# Patient Record
Sex: FEMALE | Race: WHITE | NOT HISPANIC OR LATINO | Employment: OTHER | URBAN - METROPOLITAN AREA
[De-identification: names, ages, dates, MRNs, and addresses within clinical notes are randomized per-mention and may not be internally consistent; named-entity substitution may affect disease eponyms.]

---

## 2024-05-21 ENCOUNTER — HOSPITAL ENCOUNTER (OUTPATIENT)
Dept: OTHER | Facility: HOSPITAL | Age: 74
Discharge: HOME | End: 2024-05-21

## 2024-06-19 ENCOUNTER — OFFICE VISIT (OUTPATIENT)
Dept: NEUROSURGERY | Facility: CLINIC | Age: 74
End: 2024-06-19
Payer: MEDICARE

## 2024-06-19 VITALS — SYSTOLIC BLOOD PRESSURE: 160 MMHG | HEART RATE: 66 BPM | DIASTOLIC BLOOD PRESSURE: 64 MMHG | WEIGHT: 113.1 LBS

## 2024-06-19 DIAGNOSIS — M25.512 PAIN OF LEFT SHOULDER WITH EXTERNAL ROTATION: Primary | ICD-10-CM

## 2024-06-19 DIAGNOSIS — M47.812 CERVICAL SPONDYLOSIS WITHOUT MYELOPATHY: ICD-10-CM

## 2024-06-19 PROCEDURE — 1159F MED LIST DOCD IN RCRD: CPT | Performed by: NURSE PRACTITIONER

## 2024-06-19 PROCEDURE — 99203 OFFICE O/P NEW LOW 30 MIN: CPT | Performed by: NURSE PRACTITIONER

## 2024-06-19 PROCEDURE — 1160F RVW MEDS BY RX/DR IN RCRD: CPT | Performed by: NURSE PRACTITIONER

## 2024-06-19 RX ORDER — SERTRALINE HYDROCHLORIDE 25 MG/1
1 TABLET, FILM COATED ORAL DAILY
COMMUNITY

## 2024-06-19 RX ORDER — MULTIPLE VITAMINS W/ MINERALS TAB 9MG-400MCG
1 TAB ORAL DAILY
COMMUNITY

## 2024-06-19 RX ORDER — SENNOSIDES 8.6 MG
650 CAPSULE ORAL EVERY 8 HOURS PRN
COMMUNITY

## 2024-06-19 RX ORDER — RIVAROXABAN 15 MG/1
1 TABLET, FILM COATED ORAL DAILY
COMMUNITY
Start: 2024-04-04

## 2024-06-19 NOTE — PROGRESS NOTES
Chief Complaint  Neck Pain (Neck pain that radiates into arms. Left worse.)    Subjective          Aleyda Randolph who is a 74 y.o. year old female who presents to [facility] for evaluation of cervical spine.     The patient complains of pain located in the cervical spine.  Patients states the pain has been present for several months.  The pain came on gradually. Pt suffered a fall in August of last year, landing on her left arm and fracturing her elbow. She underwent surgery and was in a brace for a duration of time. She began to developed pain in the left upper arm, which then progressed into the shoulder and neck. The pain scale level is 5.  She denies radicular pain. She has aching pain throughout the entirety of the left arm. The pain is constant and waxing/waning and described as aching.  The pain is worse at no particular time of day. Patient states lifting and head turning makes the pain worse.  Patient states rest makes the pain better.    Associated Symptoms Include: Denies numbness and tingling  Conservative Interventions Include:  Tylenol provides her modest benefit. She was referred to physical therapy, primarily for the left arm and shoulder, and some for the neck. She also received an injection in the left shoulder.     Was this the result of an injury or accident? : No    History of Previous Spinal Surgery?: No    Nicotine use: former smoker, quit 40-50 years ago    BMI: There is no height or weight on file to calculate BMI.      Review of Systems   Musculoskeletal:  Positive for myalgias, neck pain, neck stiffness and bursitis.   Neurological:  Positive for weakness and numbness.   All other systems reviewed and are negative.       Objective   Vital Signs:   /64 (BP Location: Right arm, Patient Position: Sitting, Cuff Size: Small Adult)   Pulse 66   Wt 51.3 kg (113 lb 1.6 oz)       Physical Exam  Vitals reviewed.   Constitutional:        Appearance: Normal appearance.   Musculoskeletal:      Right shoulder: No tenderness. Normal range of motion.      Left shoulder: Tenderness and crepitus present. Decreased range of motion.      Cervical back: Tenderness present. Pain with movement present. Decreased range of motion.   Neurological:      Mental Status: She is alert and oriented to person, place, and time.      Motor: Motor strength is normal.     Gait: Gait is intact.      Deep Tendon Reflexes:      Reflex Scores:       Tricep reflexes are 2+ on the right side and 2+ on the left side.       Bicep reflexes are 2+ on the right side and 2+ on the left side.       Brachioradialis reflexes are 2+ on the right side and 2+ on the left side.       Neurologic Exam     Mental Status   Oriented to person, place, and time.   Level of consciousness: alert    Motor Exam   Muscle bulk: normal  Overall muscle tone: normal    Strength   Strength 5/5 throughout.     Sensory Exam   Light touch normal.     Gait, Coordination, and Reflexes     Gait  Gait: normal    Reflexes   Right brachioradialis: 2+  Left brachioradialis: 2+  Right biceps: 2+  Left biceps: 2+  Right triceps: 2+  Left triceps: 2+  Right Sun: absent  Left Sun: absent       Result Review :       Data reviewed : Radiologic studies MRI Cervical Spine on 5/21/24 at [facility] personally reviewed and interpreted. Multilevel spondylosis, most significant at C6/7, where there are endplate changes with a disc osteophyte complex and severe facet hypertrophy resulting in moderate spinal canal and severe right, moderate left foraminal stenosis. This is the most notable finding.           Assessment and Plan    Diagnoses and all orders for this visit:    1. Pain of left shoulder with external rotation (Primary)  -     MRI Shoulder Left Without Contrast; Future    2. Cervical spondylosis without myelopathy    Pt presenting for evaluation of left arm pain, following a fall in August of last year. We  reviewed her MRI cervical Spine, demonstrating multilevel spondylosis most significant at C6/7.     Her exam demonstrates pain most significant in the left shoulder, with limited range of motion and pain in the shoulder with rotation. I am concerned that the symptoms are coming from the shoulder. Will proceed with MRI of the left shoulder to evaluate for rotator cuff tear.      Follow Up   Return if symptoms worsen or fail to improve.  Patient was given instructions and counseling regarding her condition or for health maintenance advice.

## 2024-06-20 ENCOUNTER — PATIENT ROUNDING (BHMG ONLY) (OUTPATIENT)
Dept: NEUROSURGERY | Facility: CLINIC | Age: 74
End: 2024-06-20
Payer: MEDICARE

## 2024-07-09 DIAGNOSIS — M25.512 PAIN OF LEFT SHOULDER WITH EXTERNAL ROTATION: ICD-10-CM

## 2024-07-09 NOTE — PROGRESS NOTES
Recommend follow up with Ortho regarding MRI findings of the shoulder. She can follow up with Dr. Miguel or we can refer to Ortho in [place] if she prefers.

## 2024-07-10 ENCOUNTER — TELEPHONE (OUTPATIENT)
Dept: NEUROSURGERY | Facility: CLINIC | Age: 74
End: 2024-07-10
Payer: MEDICARE

## 2024-07-10 NOTE — TELEPHONE ENCOUNTER
----- Message from Chelle RAMIREZ sent at 7/10/2024  1:40 PM EDT -----  Do you happen to have the fax number? He's in [place]. I was trying to fax MRI but I can't get anyone on the phone.  ----- Message -----  From: Crystal Mcbride APRN  Sent: 7/9/2024   3:11 PM EDT  To: Chelle Fenton    Recommend follow up with Ortho regarding MRI findings of the shoulder. She can follow up with Dr. Miguel [facility]

## 2024-07-10 NOTE — TELEPHONE ENCOUNTER
----- Message from Chelle RAMIREZ sent at 7/10/2024  1:40 PM EDT -----  Do you happen to have the fax number? He's in [place]. I was trying to fax MRI but I can't get anyone on the phone.  ----- Message -----  From: Crystal Mcbride APRN  Sent: 7/9/2024   3:11 PM EDT  To: Chelle Fenton    Recommend follow up with Ortho regarding MRI findings of the shoulder. She can follow up with Dr. Miguel or [facility] [place] [facility]

## 2024-07-10 NOTE — TELEPHONE ENCOUNTER
Spoke to patient for result note.    Recommend follow up with Ortho regarding MRI findings of the shoulder. She can follow up with Dr. Miguel or we can refer to Ortho in [place] if she prefers.

## 2024-07-10 NOTE — TELEPHONE ENCOUNTER
Patient prefers to stay with her Ortho Dr. Miguel.     Patient will call and schedule appointment.    MRI faxed to office [phone]

## 2024-07-17 ENCOUNTER — TELEPHONE (OUTPATIENT)
Dept: NEUROSURGERY | Facility: CLINIC | Age: 74
End: 2024-07-17
Payer: MEDICARE

## 2024-07-17 NOTE — TELEPHONE ENCOUNTER
Caller: TRAMAINE    Relationship: [facility]    Best call back number: [phone]    What form or medical record are you requesting: MRI REPORT    Who is requesting this form or medical record from you: [facility]    How would you like to receive the form or medical records (pick-up, mail, fax): FAX  If fax, what is the fax number: [phone]    Additional notes: DETAILS PER DASHA IN NEUROLOGY, AS MESSAGE WAS TAKEN PRIOR TO REALIZING PATIENT IS SEEN BY NEUROSURGERY; PLEASE FAX REPORT TO [facility].

## 2024-07-17 NOTE — TELEPHONE ENCOUNTER
There are no call back numbers listed. Imaging was performed at [facility], not sure why they would need us to fax report?

## 2025-03-20 ENCOUNTER — OUTSIDE FACILITY SERVICE (OUTPATIENT)
Dept: CARDIOLOGY | Facility: CLINIC | Age: 75
End: 2025-03-20
Payer: MEDICARE

## 2025-03-20 PROCEDURE — 93010 ELECTROCARDIOGRAM REPORT: CPT | Performed by: INTERNAL MEDICINE
